# Patient Record
Sex: MALE | Race: WHITE | Employment: STUDENT | URBAN - METROPOLITAN AREA
[De-identification: names, ages, dates, MRNs, and addresses within clinical notes are randomized per-mention and may not be internally consistent; named-entity substitution may affect disease eponyms.]

---

## 2017-07-18 ENCOUNTER — TELEPHONE (OUTPATIENT)
Dept: FAMILY MEDICINE CLINIC | Facility: CLINIC | Age: 22
End: 2017-07-18

## 2017-07-18 ENCOUNTER — CHARTING TRANS (OUTPATIENT)
Dept: OTHER | Age: 22
End: 2017-07-18

## 2017-07-20 ENCOUNTER — CHARTING TRANS (OUTPATIENT)
Dept: OTHER | Age: 22
End: 2017-07-20

## 2018-11-03 VITALS
DIASTOLIC BLOOD PRESSURE: 72 MMHG | HEART RATE: 74 BPM | HEIGHT: 71 IN | OXYGEN SATURATION: 99 % | WEIGHT: 175 LBS | TEMPERATURE: 98.6 F | BODY MASS INDEX: 24.5 KG/M2 | SYSTOLIC BLOOD PRESSURE: 122 MMHG | RESPIRATION RATE: 18 BRPM

## 2018-11-03 VITALS — TEMPERATURE: 98.6 F

## 2019-08-27 ENCOUNTER — HOSPITAL ENCOUNTER (OUTPATIENT)
Dept: GENERAL RADIOLOGY | Age: 24
Discharge: HOME | End: 2019-08-27
Attending: NURSE PRACTITIONER
Payer: COMMERCIAL

## 2019-08-27 ENCOUNTER — OFFICE VISIT (OUTPATIENT)
Dept: FAMILY MEDICINE CLINIC | Facility: CLINIC | Age: 24
End: 2019-08-27
Payer: COMMERCIAL

## 2019-08-27 VITALS
TEMPERATURE: 98 F | DIASTOLIC BLOOD PRESSURE: 76 MMHG | HEIGHT: 71 IN | BODY MASS INDEX: 25.34 KG/M2 | OXYGEN SATURATION: 99 % | WEIGHT: 181 LBS | HEART RATE: 62 BPM | RESPIRATION RATE: 16 BRPM | SYSTOLIC BLOOD PRESSURE: 120 MMHG

## 2019-08-27 DIAGNOSIS — M25.571 ACUTE RIGHT ANKLE PAIN: ICD-10-CM

## 2019-08-27 DIAGNOSIS — S82.891A CLOSED FRACTURE OF RIGHT ANKLE, INITIAL ENCOUNTER: Primary | ICD-10-CM

## 2019-08-27 PROBLEM — J30.9 ALLERGIC RHINITIS: Status: ACTIVE | Noted: 2019-08-27

## 2019-08-27 PROBLEM — J45.909 ASTHMA: Status: ACTIVE | Noted: 2019-08-27

## 2019-08-27 PROCEDURE — 73610 X-RAY EXAM OF ANKLE: CPT | Performed by: NURSE PRACTITIONER

## 2019-08-27 PROCEDURE — 99203 OFFICE O/P NEW LOW 30 MIN: CPT | Performed by: NURSE PRACTITIONER

## 2019-08-27 NOTE — PROGRESS NOTES
HPI:    Patient ID: Miguel Bautista is a 25year old male. HPI    Fabio Vargas is present today after not having been seen since 2015 with concern about right ankle pain that rolled out when he jumped.  Was not able to walk on it the next day due to pain and swe Right ankle -chip noted, Pending radiology report       Patient Instructions   Referral to ortho - take copy of x-ray to appointment . Air cast when ambulatory. Ice and ibuprofen as needed for the pain.    Follow-up in a month or so for physical and to

## 2019-08-27 NOTE — PATIENT INSTRUCTIONS
Referral to ortho - take copy of x-ray to appointment . Air cast when ambulatory. Ice and ibuprofen as needed for the pain. Follow-up in a month or so for physical and to re-establish with Dr. Rigo Saba.

## 2019-08-28 ENCOUNTER — TELEPHONE (OUTPATIENT)
Dept: FAMILY MEDICINE CLINIC | Facility: CLINIC | Age: 24
End: 2019-08-28

## 2019-08-28 NOTE — TELEPHONE ENCOUNTER
----- Message from [employer] sent at 8/27/2019  2:02 PM CDT -----  Patient in aircast and referred to Dr. Marlyne Apley. Please fax a copy of the results to them. Thank you.

## 2020-07-14 ENCOUNTER — TELEPHONE (OUTPATIENT)
Dept: FAMILY MEDICINE CLINIC | Facility: CLINIC | Age: 25
End: 2020-07-14

## 2020-07-14 NOTE — TELEPHONE ENCOUNTER
Patient has not been seen by Dr. Ani Chand since 6/23/2015. Patient saw Yonatan White in 2019 for ankle injury. Advised patient of testing sites that do not require a doctors order. Pt v/u.

## 2020-07-14 NOTE — TELEPHONE ENCOUNTER
Would like to try to get a rapid covid test, resently was at a wedding. Please give him a call back  No future appointments.

## 2020-10-30 ENCOUNTER — APPOINTMENT (OUTPATIENT)
Dept: LAB | Age: 25
End: 2020-10-30
Attending: NURSE PRACTITIONER
Payer: COMMERCIAL

## 2020-10-30 ENCOUNTER — VIRTUAL PHONE E/M (OUTPATIENT)
Dept: FAMILY MEDICINE CLINIC | Facility: CLINIC | Age: 25
End: 2020-10-30

## 2020-10-30 DIAGNOSIS — Z20.822 CLOSE EXPOSURE TO COVID-19 VIRUS: ICD-10-CM

## 2020-10-30 DIAGNOSIS — Z20.822 CLOSE EXPOSURE TO COVID-19 VIRUS: Primary | ICD-10-CM

## 2020-10-30 PROCEDURE — 99213 OFFICE O/P EST LOW 20 MIN: CPT | Performed by: NURSE PRACTITIONER

## 2020-10-30 NOTE — PATIENT INSTRUCTIONS
Order for Covid test done. Patient understands if he gets shortness of breath or any difficulty breathing, that he needs to go to the ER. Quarantine in the meantime.

## 2020-10-30 NOTE — PROGRESS NOTES
HPI:    Patient ID: Lety Ornelas is a 22year old male. 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 His boss was positive (had test on Tuesday and got results yesterday, Thursday) and he was with her on Monday. No symptoms currently.  Elena Nguyen

## 2023-03-29 ENCOUNTER — TELEPHONE (OUTPATIENT)
Dept: FAMILY MEDICINE CLINIC | Facility: CLINIC | Age: 28
End: 2023-03-29

## 2023-03-29 NOTE — TELEPHONE ENCOUNTER
Pt confirmed hit in left back near kidney with baseball and is urinating blood. Advised per EM go to ED for evaluation. Urinating blood needs imaging most likely STAT CT scan which we can not do in the office. Also if serious injury treatment can be done at the hospital. Pt understands and is agreeable.